# Patient Record
Sex: FEMALE | Race: WHITE | Employment: FULL TIME | URBAN - METROPOLITAN AREA
[De-identification: names, ages, dates, MRNs, and addresses within clinical notes are randomized per-mention and may not be internally consistent; named-entity substitution may affect disease eponyms.]

---

## 2017-04-08 ENCOUNTER — TELEPHONE (OUTPATIENT)
Dept: FAMILY MEDICINE | Facility: CLINIC | Age: 27
End: 2017-04-08

## 2017-04-08 NOTE — TELEPHONE ENCOUNTER
4/8/2017    Call Regarding Preventive Health Screening Cervical/PAP    Attempt 1    Message on voicemail     Comments:         Outreach   Karis Roberts

## 2017-09-10 ENCOUNTER — HEALTH MAINTENANCE LETTER (OUTPATIENT)
Age: 27
End: 2017-09-10

## 2020-03-09 ENCOUNTER — TELEPHONE (OUTPATIENT)
Dept: NEUROLOGY | Facility: CLINIC | Age: 30
End: 2020-03-09

## 2020-03-09 NOTE — TELEPHONE ENCOUNTER
What is the concern that needs to be addressed by a nurse? Pt has not seen Dr. Hansen since 2016 for Epilepsy - received a DMV form and was wondering what she should do. Would like a call back    May a detailed message be left on voicemail?     Date of last office visit:     Message routed to: [facility] RN Pool

## 2020-03-09 NOTE — TELEPHONE ENCOUNTER
Patient calls the clinic to request assistance with her DMV form. Her PCP declined to sign it and advised the patient to return to neurology for signature. She has not had any seizures since she was discharged from our care in 2016.    The patient will fax the form to us. I told her I can't guarantee that Dr. Hansen will sign off on it. She verbalized understanding.

## 2020-03-12 ENCOUNTER — TELEPHONE (OUTPATIENT)
Dept: NEUROLOGY | Facility: CLINIC | Age: 30
End: 2020-03-12

## 2020-03-12 NOTE — TELEPHONE ENCOUNTER
Form printed to initiate preparation. Expected completion date is by the end of the business day, today, 3/12/20.     Form reviewed with Dr. Hansen. MD declines signing the form until patient returns for an office visit. This was communicated to the patient. She is worried about the form deadline, I advised that she contact the DMV and request an extension on her papers. Patient verbalized understanding. She was unable to make an office visit at this time and requests intake to return a call to her for scheduling.

## 2020-03-19 NOTE — TELEPHONE ENCOUNTER
This nurse contacted the patient. The DMV is now closed due to the coronavirus outbreak. The governor said at a press conference yesterday that they would be allowing flexibility to those whose DL  this month. I notified the patient that I attempted to reach her PCP but was unsuccessful. We confirmed her appointment for next Wednesday. She asked if she can bring her children to the appointment and I notified her that we currently cannot allow visitors with patients. She verbalized understanding and will arrange for childcare.

## 2020-03-25 ENCOUNTER — VIRTUAL VISIT (OUTPATIENT)
Dept: NEUROLOGY | Facility: CLINIC | Age: 30
End: 2020-03-25

## 2020-03-25 DIAGNOSIS — G40.209 PARTIAL EPILEPSY WITH IMPAIRMENT OF CONSCIOUSNESS (H): Primary | ICD-10-CM

## 2020-03-25 NOTE — LETTER
3/25/2020    RE: Nena Kim  [address] [place] [id number]       Nena Kim is a 29 year old female who is being evaluated via a billable telephone visit.      Nena Kim complains of    Chief Complaint   Patient presents with     Follow Up       I have reviewed and updated the patient's Past Medical History, Social History, Family History and Medication List.    Naline Calhoun, CMA    ALLERGIES  Barbiturates and Dilantin [phenytoin]    Additional provider notes: She needs DMV form signed. Last seizure was 4/2004. The patient had subdural grids placed and had a left temporal cavernous hemangioma resected on 7/12/2004 and a right parietal cavernous hemangioma resection on 12/30/2004. This visit was completed over the telephone to comply with social distancing during COVID 19 pandemic. Patient/caregiver has consented to this telephone visit. Since our last visit patient had 0 seizures. Patient did not fall , did not have emergency room visit  or did not have any hospitalization . Currently, on NO antiepileptic drugs. Patient/caregiver was agreeable with plan of care.     Antiepileptic drug: NONE    ROS: Rare headaches, No Fatigue, no nausea, no vomiting, no diarrhea, no rashes.    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  Patient has had left temporal and right parietal cavernous hemangioma resection in 2004.  Since 4/2004 she has been seizure free. She was weaned off of levetiracetam in 2006 and has remained seizure free.         PLAN:   1.  No antiepileptic drug needed  2. Follow up  As needed per patient, no follow up  Visit scheduled   3. Encouraged patient to call DMV and inquire why she has recurrent DMV forms that require epilepsy doctor completion.   4. If she has breakthrough seizure we may restart levetiracetam.     Time on phone: 7 minutes. Good kt communication efforts were made with patient over the phone to be HIPPA  compliant.     Sabiha Hansen MD

## 2020-03-25 NOTE — PROGRESS NOTES
"Nena Kim is a 29 year old female who is being evaluated via a billable telephone visit.      The patient has been notified of following:     \"This telephone visit will be conducted via a call between you and your physician/provider. We have found that certain health care needs can be provided without the need for a physical exam.  This service lets us provide the care you need with a short phone conversation.  If a prescription is necessary we can send it directly to your pharmacy.  If lab work is needed we can place an order for that and you can then stop by our lab to have the test done at a later time.    If during the course of the call the physician/provider feels a telephone visit is not appropriate, you will not be charged for this service.\"     Nena Kim complains of    Chief Complaint   Patient presents with     Follow Up       I have reviewed and updated the patient's Past Medical History, Social History, Family History and Medication List.    Dean Calhoun, CMA    ALLERGIES  Barbiturates and Dilantin [phenytoin]    Additional provider notes: She needs DMV form signed. Last seizure was 4/2004. The patient had subdural grids placed and had a left temporal cavernous hemangioma resected on 7/12/2004 and a right parietal cavernous hemangioma resection on 12/30/2004. This visit was completed over the telephone to comply with social distancing during COVID 19 pandemic. Patient/caregiver has consented to this telephone visit. Since our last visit patient had 0 seizures. Patient did not fall , did not have emergency room visit  or did not have any hospitalization . Currently, on NO antiepileptic drugs. Patient/caregiver was agreeable with plan of care.     Antiepileptic drug: NONE    ROS: Rare headaches, No Fatigue, no nausea, no vomiting, no diarrhea, no rashes.    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  Patient has had left temporal " and right parietal cavernous hemangioma resection in 2004.  Since 4/2004 she has been seizure free. She was weaned off of levetiracetam in 2006 and has remained seizure free.         PLAN:   1.  No antiepileptic drug needed  2. Follow up  As needed per patient, no follow up  Visit scheduled   3. Encouraged patient to call DMV and inquire why she has recurrent DMV forms that require epilepsy doctor completion.   4. If she has breakthrough seizure we may restart levetiracetam.       Time on phone: 7 minutes. Good kt communication efforts were made with patient over the phone to be HIPPA compliant.     Sabiha Hansen MD

## 2022-01-27 ENCOUNTER — TELEPHONE (OUTPATIENT)
Dept: NEUROLOGY | Facility: CLINIC | Age: 32
End: 2022-01-27
Payer: COMMERCIAL

## 2022-02-08 ENCOUNTER — VIRTUAL VISIT (OUTPATIENT)
Dept: NEUROLOGY | Facility: CLINIC | Age: 32
End: 2022-02-08
Payer: COMMERCIAL

## 2022-02-08 DIAGNOSIS — G40.209 PARTIAL EPILEPSY WITH IMPAIRMENT OF CONSCIOUSNESS (H): Primary | ICD-10-CM

## 2022-02-08 NOTE — PROGRESS NOTES
Nena is a 31 year old who is being evaluated via a billable telephone visit.      What phone number would you like to be contacted at? [phone]  How would you like to obtain your AVS? Mail a copy  Phone call duration: 29 minutes    Nena Kim is a 31 year old female who is being evaluated via a billable telephone visit.        Additional provider notes: She needs DMV form signed for 4th year. She denies episodes of loss of awareness, no seizure like spells. Last seizure was 4/2004. The patient had subdural grids placed and had a left temporal cavernous hemangioma resected on 7/12/2004 and a right parietal cavernous hemangioma resection on 12/30/2004. Since our last visit patient had 0 seizures. Patient did not fall , did not have emergency room visit  or did not have any hospitalization . Currently, on NO antiepileptic drugs. Patient/caregiver was agreeable with plan of care.     Antiepileptic drug: NONE    Social: works at [employer] in [employer] department, she is driving, she has twins ages 3, and one step.     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  Patient has had left temporal and right parietal cavernous hemangioma resection in 2004.  Since 4/2004 she has been seizure free. She was weaned off of levetiracetam in 2006 and has remained seizure free.      Factor V Leiden heterozygous mutation: She saw a RN and was advised to eat a special diet, in 2018 she was on coumadin. Now, she is not on a blood thinner. I suspect she may blood thinner.      PLAN:   1. No antiepileptic drug needed  2. Follow up  As needed per patient, no follow up  Visit scheduled   3. Encouraged patient to call DMV and inquire why she has recurrent DMV forms that require epilepsy doctor completion.   4. If she has breakthrough seizure we may restart levetiracetam.   5. Video EEG (last one 2004)  6. MRI BRAIN (last one 2006)       Establish care with a general  neurologist for annual or every other year check and for history of seizures, history of cavernous hemangioma, and history of factor V Leiden mutation.  Does she need Aspirin or Plavix for stroke prevention?  This neurologist can also complete your driving form every 4 years. Does she need follow up  MRI brain ?     Time on phone: 29 minutes. Good kt communication efforts were made with patient over the phone to be HIPPA compliant.     Sabiha Hansen MD

## 2022-02-08 NOTE — PATIENT INSTRUCTIONS
Establish care with a general neurologist for annual or every other year check and for history of seizures, history of cavernous hemangioma, and history of factor V Leiden mutation.  Do you need to be on aspirin or Plavix for stroke prevention?  This neurologist can also complete your driving form every 4 years.     Follow up [facility]     MRI brain     Video EEG       Sabiha Hansen MD

## 2022-02-08 NOTE — LETTER
2022     RE: Nena Kim  : 1990   MRN: [id number]      Dear Colleague,    Thank you for referring your patient, Nena Kim, to the [facility] EPILEPSY CARE at [facility]. Please see a copy of my visit note below.    Nena is a 31 year old who is being evaluated via a billable telephone visit.      What phone number would you like to be contacted at? [phone]  How would you like to obtain your AVS? Mail a copy  Phone call duration: 29 minutes    Nena Kim is a 31 year old female who is being evaluated via a billable telephone visit.        Additional provider notes: She needs DMV form signed for 4th year. She denies episodes of loss of awareness, no seizure like spells. Last seizure was 2004. The patient had subdural grids placed and had a left temporal cavernous hemangioma resected on 2004 and a right parietal cavernous hemangioma resection on 2004. Since our last visit patient had 0 seizures. Patient did not fall , did not have emergency room visit  or did not have any hospitalization . Currently, on NO antiepileptic drugs. Patient/caregiver was agreeable with plan of care.     Antiepileptic drug: NONE    Social: works at [employer] in [employer] department, she is driving, she has twins ages 3, and one step.     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  Patient has had left temporal and right parietal cavernous hemangioma resection in .  Since 2004 she has been seizure free. She was weaned off of levetiracetam in  and has remained seizure free.      Factor V Leiden heterozygous mutation: She saw a RN and was advised to eat a special diet, in 2018 she was on coumadin. Now, she is not on a blood thinner. I suspect she may blood thinner.      PLAN:   1. No antiepileptic drug needed  2. Follow up  As needed per patient, no follow up  Visit scheduled   3.  Encouraged patient to call DMV and inquire why she has recurrent DMV forms that require epilepsy doctor completion.   4. If she has breakthrough seizure we may restart levetiracetam.   5. Video EEG (last one 2004)  6. MRI BRAIN (last one 2006)       Establish care with a general neurologist for annual or every other year check and for history of seizures, history of cavernous hemangioma, and history of factor V Leiden mutation.  Does she need Aspirin or Plavix for stroke prevention?  This neurologist can also complete your driving form every 4 years. Does she need follow up  MRI brain ?     Time on phone: 29 minutes. Good kt communication efforts were made with patient over the phone to be HIPPA compliant.       Sabiha Hansen MD

## 2022-02-21 ENCOUNTER — VIRTUAL VISIT (OUTPATIENT)
Dept: NEUROLOGY | Facility: CLINIC | Age: 32
End: 2022-02-21
Payer: COMMERCIAL

## 2022-02-21 DIAGNOSIS — G40.209 PARTIAL EPILEPSY WITH IMPAIRMENT OF CONSCIOUSNESS (H): Primary | ICD-10-CM

## 2022-02-21 NOTE — LETTER
2022     RE: Nena Kim  : 1990   MRN: [id number]      Dear Colleague,    Thank you for referring your patient, Nena Kim, to the [facility] EPILEPSY CARE at [facility]. Please see a copy of my visit note below.    I call patient to do the rooming but no answer , left a message for patient to call the clinic back.    Dean Calhoun CMA    Called patient, left another message to call back for rooming   Ashley Pantoja CMA  .  Called and there was no answer. Also, sent amwell message via text and email with no response. Will have to reschedule.     Sabiha Hansen MD

## 2022-02-21 NOTE — PROGRESS NOTES
I call patient to do the rooming but no answer , left a message for patient to call the clinic back.    Dean Calhoun, CMA

## 2022-02-21 NOTE — PROGRESS NOTES
Called and there was no answer. Also, sent [employer] message via text and email with no response. Will have to reschedule.     Sabiha Hansen MD

## 2022-03-21 ENCOUNTER — ANCILLARY PROCEDURE (OUTPATIENT)
Dept: NEUROLOGY | Facility: CLINIC | Age: 32
End: 2022-03-21
Attending: PSYCHIATRY & NEUROLOGY
Payer: COMMERCIAL

## 2022-03-21 DIAGNOSIS — G40.209 PARTIAL EPILEPSY WITH IMPAIRMENT OF CONSCIOUSNESS (H): ICD-10-CM
